# Patient Record
Sex: MALE | Race: ASIAN | Employment: OTHER | URBAN - METROPOLITAN AREA
[De-identification: names, ages, dates, MRNs, and addresses within clinical notes are randomized per-mention and may not be internally consistent; named-entity substitution may affect disease eponyms.]

---

## 2021-07-07 PROBLEM — J30.9 ALLERGIC RHINITIS: Status: ACTIVE | Noted: 2019-05-09

## 2021-07-07 PROBLEM — R60.0 EDEMA OF LOWER EXTREMITY: Status: ACTIVE | Noted: 2020-07-21

## 2021-07-07 PROBLEM — M85.80 OSTEOPENIA: Status: ACTIVE | Noted: 2017-12-10

## 2021-07-07 PROBLEM — E11.3293 TYPE 2 DIABETES MELLITUS WITH BOTH EYES AFFECTED BY MILD NONPROLIFERATIVE RETINOPATHY WITHOUT MACULAR EDEMA, WITH LONG-TERM CURRENT USE OF INSULIN (HCC): Status: ACTIVE | Noted: 2017-07-24

## 2021-07-07 PROBLEM — E11.65 HYPERGLYCEMIA DUE TO TYPE 2 DIABETES MELLITUS (HCC): Status: ACTIVE | Noted: 2019-05-09

## 2021-07-07 PROBLEM — G62.9 POLYNEUROPATHY: Status: ACTIVE | Noted: 2020-08-25

## 2021-07-07 PROBLEM — M25.562 PAIN IN LEFT KNEE: Status: ACTIVE | Noted: 2020-07-21

## 2021-07-07 PROBLEM — K29.50 CHRONIC GASTRITIS: Status: ACTIVE | Noted: 2021-06-18

## 2021-07-07 PROBLEM — H10.89 OTHER CONJUNCTIVITIS: Status: ACTIVE | Noted: 2018-01-09

## 2021-07-07 PROBLEM — Z79.4 TYPE 2 DIABETES MELLITUS WITH BOTH EYES AFFECTED BY MILD NONPROLIFERATIVE RETINOPATHY WITHOUT MACULAR EDEMA, WITH LONG-TERM CURRENT USE OF INSULIN (HCC): Status: ACTIVE | Noted: 2017-07-24

## 2021-07-07 PROBLEM — E16.2 HYPOGLYCEMIC DISORDER: Status: ACTIVE | Noted: 2020-08-25

## 2021-07-07 PROBLEM — S60.222A CONTUSION OF LEFT HAND: Status: ACTIVE | Noted: 2020-06-23

## 2021-07-16 ENCOUNTER — LAB ENCOUNTER (OUTPATIENT)
Dept: LAB | Age: 86
End: 2021-07-16
Attending: SURGERY
Payer: MEDICARE

## 2021-07-16 DIAGNOSIS — K82.9 GALLBLADDER ATTACK: Primary | ICD-10-CM

## 2021-07-16 PROCEDURE — 88304 TISSUE EXAM BY PATHOLOGIST: CPT

## 2021-10-04 ENCOUNTER — OFFICE VISIT (OUTPATIENT)
Dept: PHYSICAL MEDICINE AND REHAB | Facility: CLINIC | Age: 86
End: 2021-10-04
Payer: MEDICARE

## 2021-10-04 ENCOUNTER — LAB ENCOUNTER (OUTPATIENT)
Dept: LAB | Age: 86
End: 2021-10-04
Attending: PHYSICAL MEDICINE & REHABILITATION
Payer: MEDICARE

## 2021-10-04 VITALS — HEIGHT: 67 IN | WEIGHT: 132 LBS | BODY MASS INDEX: 20.72 KG/M2

## 2021-10-04 DIAGNOSIS — K29.50 CHRONIC GASTRITIS, PRESENCE OF BLEEDING UNSPECIFIED, UNSPECIFIED GASTRITIS TYPE: ICD-10-CM

## 2021-10-04 DIAGNOSIS — N18.30 CHRONIC RENAL IMPAIRMENT, STAGE 3 (MODERATE), UNSPECIFIED WHETHER STAGE 3A OR 3B CKD (HCC): ICD-10-CM

## 2021-10-04 DIAGNOSIS — M25.60 LIMITED JOINT RANGE OF MOTION: ICD-10-CM

## 2021-10-04 DIAGNOSIS — R53.1 WEAKNESS: ICD-10-CM

## 2021-10-04 DIAGNOSIS — E11.49 TYPE 2 DIABETES MELLITUS WITH OTHER NEUROLOGIC COMPLICATION, WITHOUT LONG-TERM CURRENT USE OF INSULIN (HCC): ICD-10-CM

## 2021-10-04 DIAGNOSIS — R53.1 WEAKNESS: Primary | ICD-10-CM

## 2021-10-04 PROBLEM — M62.50 MUSCLE ATROPHY: Status: ACTIVE | Noted: 2021-08-02

## 2021-10-04 PROBLEM — E11.9 DIABETES MELLITUS (HCC): Status: ACTIVE | Noted: 2017-07-24

## 2021-10-04 PROBLEM — K22.70 BARRETT'S ESOPHAGUS: Status: ACTIVE | Noted: 2021-08-02

## 2021-10-04 PROCEDURE — 83036 HEMOGLOBIN GLYCOSYLATED A1C: CPT | Performed by: PHYSICAL MEDICINE & REHABILITATION

## 2021-10-04 PROCEDURE — 36415 COLL VENOUS BLD VENIPUNCTURE: CPT

## 2021-10-04 PROCEDURE — 86039 ANTINUCLEAR ANTIBODIES (ANA): CPT

## 2021-10-04 PROCEDURE — 85652 RBC SED RATE AUTOMATED: CPT

## 2021-10-04 PROCEDURE — 86431 RHEUMATOID FACTOR QUANT: CPT

## 2021-10-04 PROCEDURE — 84443 ASSAY THYROID STIM HORMONE: CPT

## 2021-10-04 PROCEDURE — 82746 ASSAY OF FOLIC ACID SERUM: CPT | Performed by: PHYSICAL MEDICINE & REHABILITATION

## 2021-10-04 PROCEDURE — 3008F BODY MASS INDEX DOCD: CPT | Performed by: PHYSICAL MEDICINE & REHABILITATION

## 2021-10-04 PROCEDURE — 86038 ANTINUCLEAR ANTIBODIES: CPT

## 2021-10-04 PROCEDURE — 85025 COMPLETE CBC W/AUTO DIFF WBC: CPT

## 2021-10-04 PROCEDURE — 82607 VITAMIN B-12: CPT | Performed by: PHYSICAL MEDICINE & REHABILITATION

## 2021-10-04 PROCEDURE — 99204 OFFICE O/P NEW MOD 45 MIN: CPT | Performed by: PHYSICAL MEDICINE & REHABILITATION

## 2021-10-04 PROCEDURE — 82306 VITAMIN D 25 HYDROXY: CPT

## 2021-10-04 RX ORDER — HUMAN INSULIN 100 [USP'U]/ML
27 INJECTION, SUSPENSION SUBCUTANEOUS DAILY
COMMUNITY

## 2021-10-04 NOTE — PROGRESS NOTES
[address]  Progress Note    CHIEF COMPLAINT:  Patient presents with:  Back Pain: New right handed pt comes with with upper back pain that radiates to back of shoulers and and left hand pain.  Has overall wea release     • Benazepril HCl 10 MG Oral Tab Take 10 mg by mouth daily. • finasteride 5 MG Oral Tab Take 5 mg by mouth daily. • atorvastatin 10 MG Oral Tab Take 10 mg by mouth daily.      • Levothyroxine Sodium 50 MCG Oral Tab Take 50 mcg by mouth da lb (59.9 kg)   BMI 20.67 kg/m²     Body mass index is 20.67 kg/m². General: No immediate distress  Head: Normocephalic/ Atraumatic  Extremities: No upper extremity edema bilaterally. Peripheral pulses intact.   Spine:  full and painfree cervical ROM in ([address]  I could not see his hemoglobin A1c on care everywhere, will check on his diabetic control.  - HGB A1C WITH MBG ESTIMATION        RTC:    For EMG      Discharge Instructions were provided as documented in AVS summary.   The patient was in agreement wi

## 2021-10-05 NOTE — PROGRESS NOTES
Thus far lab results reveal a normal TSH, rheumatoid factor, vitamin D, vitamin B12, folate. Sed rate was slightly elevated. CBC shows anemia with high RDW and a hemoglobin of 10.8. Hemoglobin A1c is 8. CARYL is pending. Will discuss with Dr. Gilma Kelsey.

## 2021-10-25 ENCOUNTER — TELEPHONE (OUTPATIENT)
Dept: NEUROLOGY | Facility: CLINIC | Age: 86
End: 2021-10-25

## 2021-10-25 NOTE — TELEPHONE ENCOUNTER
----- Message from Maricruz Saleh MD sent at 10/19/2021  2:53 PM CDT -----  Please let the patient know that his blood tests are back. 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   I put in a call with Dr. Rylan Gilbert t

## 2021-10-28 NOTE — TELEPHONE ENCOUNTER
S/w patients daughter-- states Delphine Banks MD.      Unable to communicate with patient. Sending out labs to address on file.

## 2021-11-04 ENCOUNTER — PROCEDURE VISIT (OUTPATIENT)
Dept: PHYSICAL MEDICINE AND REHAB | Facility: CLINIC | Age: 86
End: 2021-11-04
Payer: MEDICARE

## 2021-11-04 DIAGNOSIS — R53.1 WEAKNESS: ICD-10-CM

## 2021-11-04 DIAGNOSIS — E11.42 DIABETIC POLYNEUROPATHY ASSOCIATED WITH TYPE 2 DIABETES MELLITUS (HCC): Primary | ICD-10-CM

## 2021-11-04 PROCEDURE — 95886 MUSC TEST DONE W/N TEST COMP: CPT | Performed by: PHYSICAL MEDICINE & REHABILITATION

## 2021-11-04 PROCEDURE — 95909 NRV CNDJ TST 5-6 STUDIES: CPT | Performed by: PHYSICAL MEDICINE & REHABILITATION

## 2021-11-04 NOTE — PATIENT INSTRUCTIONS
1. See rheumatologist Dr. Paul Wray at [facility] per Dr. Terry Mckeon recommendation  2. Do physical therapy with Flaco Serrano at [facility]  3.  Return to see me in 6 weeks

## 2021-11-11 NOTE — PROGRESS NOTES
[address]  Electromyography Consultation      History of Present Illness:    I had the opportunity to see Christiano Guardian for electrodiagnostic consultation today.   The patient is a 80year old male with a c by mouth daily. • Insulin Aspart Prot & Aspart 70/30 (NOVOLOG MIX 70/30) (70-30) 100 UNIT/ML SC SUPN 30 Units every morning. (Patient not taking: Reported on 10/4/2021)     • metFORMIN HCl  MG Oral Tablet 24 Hr Take 1,000 mg by mouth daily.      • Ref. 6.50 2.5          Knee  Knee - Ankle NR NR  NR NR NR      Ref. Ref. 40.0                EMG Summary Table     Spontaneous MUAP Recruitment    IA Fib PSW Fasc H.F. Amp Dur.  PPP Pattern   R. DELTOID N None None None None N N N N   R. TRICEPS N None PLAN:  1. Diabetic polyneuropathy associated with type 2 diabetes mellitus ([facility] [address]  The patient has clear electrodiagnostic evidence of diabetic neuropathy.   I am recommending physical therapy for generalized strengthening, endurance and gait/balance trainin

## 2021-11-15 ENCOUNTER — TELEPHONE (OUTPATIENT)
Dept: PHYSICAL MEDICINE AND REHAB | Facility: CLINIC | Age: 86
End: 2021-11-15

## 2021-11-15 NOTE — TELEPHONE ENCOUNTER
EMG 11/4/21. Note states consistent with diabetic neuropathy. NOV 1/10/22. Patient calling for EMG results. Please advise.

## 2021-11-15 NOTE — TELEPHONE ENCOUNTER
Text Messages    Message # [phone]         2021 03:58p   [JAIDEN]  Primary MD:  Hillary Seo  From:  Debi Awad  :  34  Phone#:  [phone]  ----------------------------------------------------------------------  OFFICE LOCATION:  [facility]

## 2021-11-16 NOTE — TELEPHONE ENCOUNTER
Left message on patient`s voice mail to call office for results of EMG. Will fax PT order to Niesha Hector physical therapist at . Note below from EMG from 11/4/21. Return in about 6 weeks (around 12/16/2021).   1. See rheumatologist

## 2021-12-20 ENCOUNTER — PATIENT MESSAGE (OUTPATIENT)
Dept: PHYSICAL MEDICINE AND REHAB | Facility: CLINIC | Age: 86
End: 2021-12-20

## 2021-12-21 NOTE — TELEPHONE ENCOUNTER
From: Ivan Rowe  To: Addie Faye MD  Sent: 12/20/2021 9:02 PM CST  Subject: EMG Report    Hello,  How may I access Dr. Chapin Krishnamurthy report of the EMG he performed on 11/4/2021?  I was told it's in [facility], but I'm not able to locate it in test results
